# Patient Record
Sex: FEMALE | Race: WHITE | NOT HISPANIC OR LATINO | URBAN - NONMETROPOLITAN AREA
[De-identification: names, ages, dates, MRNs, and addresses within clinical notes are randomized per-mention and may not be internally consistent; named-entity substitution may affect disease eponyms.]

---

## 2017-03-16 NOTE — PATIENT DISCUSSION
RAFAELA OU:  PRESCRIBED UV PROTECTION TO SLOW GROWTH. PRESCRIBE ARTIFICAL TEARS TO INCREASE COMFORT.

## 2017-03-16 NOTE — PATIENT DISCUSSION
POSTERIOR CAPSULAR FIBROSIS, OU:  VISUALLY SIGNIFICANT. OPTION OF YAG LASER VERSUS UPDATING GLASSES VERSUS FOLLOWING DISCUSSED. RBA'S DISCUSSED, PATIENT UNDERSTANDS AND DESIRES YAG LASER TO INCREASE VISION FOR READING. SCHEDULE YAG LASER OD FIRST THEN OS.

## 2017-03-16 NOTE — PATIENT DISCUSSION
NONPROLIFERATIVE DIABETIC RETINOPATHY: PATIENT INSTRUCTED TO RETURN TO PCP FOR CONTINUED BLOOD SUGAR MONITORING AND RETURN FOR FOLLOW-UP AS SCHEDULED FOR DILATED FUNDUS EXAM.

## 2018-06-05 PROBLEM — Z96.1: Noted: 2020-12-08

## 2018-06-05 PROBLEM — H35.341: Noted: 2018-06-05

## 2018-06-05 PROBLEM — H11.822: Noted: 2020-12-08

## 2018-06-05 PROBLEM — H11.821: Noted: 2020-12-08

## 2019-05-07 NOTE — PATIENT DISCUSSION
DRY EYE SYNDROME OS: RX ARTIFICIAL TEARS AS NEEDED TO INCREASE COMFORT. IF SYMPTOMS PERSIST CONSIDER PUNCTAL PLUGS OR RESTASIS.

## 2020-12-08 ENCOUNTER — IMPORTED ENCOUNTER (OUTPATIENT)
Dept: URBAN - NONMETROPOLITAN AREA CLINIC 1 | Facility: CLINIC | Age: 60
End: 2020-12-08

## 2020-12-08 PROCEDURE — 92012 INTRM OPH EXAM EST PATIENT: CPT

## 2020-12-08 NOTE — PATIENT DISCUSSION
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

## 2021-07-30 ENCOUNTER — IMPORTED ENCOUNTER (OUTPATIENT)
Dept: URBAN - NONMETROPOLITAN AREA CLINIC 1 | Facility: CLINIC | Age: 61
End: 2021-07-30

## 2021-07-30 PROBLEM — Z96.1: Noted: 2021-07-30

## 2021-07-30 PROBLEM — H35.371: Noted: 2021-07-30

## 2021-07-30 PROBLEM — H26.493: Noted: 2021-07-30

## 2021-07-30 PROBLEM — H43.813: Noted: 2021-07-30

## 2021-07-30 PROBLEM — H02.403: Noted: 2021-07-30

## 2021-07-30 PROCEDURE — 92134 CPTRZ OPH DX IMG PST SGM RTA: CPT

## 2021-07-30 PROCEDURE — 99214 OFFICE O/P EST MOD 30 MIN: CPT

## 2021-07-30 NOTE — PATIENT DISCUSSION
PVD (Chronic OD Acute OS)-Retina flat 360 with no breaks tears or heme.-S&S of RD/RT reviewed with pt.-Stressed that pt should contact our office right away with any changes or increase in symptoms. -MAC OCT ordered today and reviewd-RTC 7-8 weeks for follow-upERM OD-Longstanding and stable MAC OCT ordered and reviewed todayPCO OD>OS-Scheduled for [facility] [place] in [place]; keep apptPC IOL OU-Doing well monitor. Ptosis OU-Discussed Upneeq rx sent; 's Notes: [facility]

## 2021-09-16 NOTE — PATIENT DISCUSSION
NONPROLIFERATIVE DIABETIC RETINOPATHY: STABLE, MAC OCT DONE TO DOCUMENT.  PATIENT INSTRUCTED TO RETURN TO PCP FOR CONTINUED BLOOD SUGAR MONITORING AND RETURN FOR FOLLOW-UP AS SCHEDULED FOR DILATED FUNDUS EXAM.

## 2021-09-20 ENCOUNTER — IMPORTED ENCOUNTER (OUTPATIENT)
Dept: URBAN - NONMETROPOLITAN AREA CLINIC 1 | Facility: CLINIC | Age: 61
End: 2021-09-20

## 2021-09-20 PROBLEM — H43.813: Noted: 2021-09-20

## 2021-09-20 PROBLEM — H10.412: Noted: 2021-09-20

## 2021-09-20 PROBLEM — Z96.1: Noted: 2021-09-20

## 2021-09-20 PROBLEM — H35.371: Noted: 2021-09-20

## 2021-09-20 PROBLEM — H26.492: Noted: 2021-09-20

## 2021-09-20 PROBLEM — H02.403: Noted: 2021-09-20

## 2021-09-20 PROCEDURE — 99214 OFFICE O/P EST MOD 30 MIN: CPT

## 2021-09-20 NOTE — PATIENT DISCUSSION
GPC Os-Symptoms started after ocular procedure in [address] [place] [address] (conj cyst removal). -No corneal involvement-Start PRED 1% QID OS and recheck in 1 week. PVD (Chronic OU)-Retina flat 360 with no breaks tears or heme.-S&S of RD/RT reviewed with pt.-Stressed that pt should contact our office right away with any changes or increase in symptoms. ERM OD-Longstanding and stable PCO OSPC IOL OU-Doing well monitor. Ptosis OU-Discussed Upneeq rx sent; 's Notes: [facility]

## 2021-09-28 ENCOUNTER — PREPPED CHART (OUTPATIENT)
Dept: RURAL CLINIC 2 | Facility: CLINIC | Age: 61
End: 2021-09-28

## 2021-09-28 ENCOUNTER — IMPORTED ENCOUNTER (OUTPATIENT)
Dept: URBAN - NONMETROPOLITAN AREA CLINIC 1 | Facility: CLINIC | Age: 61
End: 2021-09-28

## 2021-09-28 PROCEDURE — 99213 OFFICE O/P EST LOW 20 MIN: CPT

## 2021-09-28 NOTE — PATIENT DISCUSSION
GPC OS-Much improved etiology unclear-Reduce PRED to BID OS x 2 weeks then stop. PVD (Chronic OU)-Retina flat 360 with no breaks tears or heme.-S&S of RD/RT reviewed with pt.-Stressed that pt should contact our office right away with any changes or increase in symptoms. ERM OD-Longstanding and stable PCO OS-Monitor. PC IOL OU-Doing well monitor.   S/P YAG PC ODPtosis OU-Discussed Upneeq rx sent; 's Notes: [facility]

## 2022-03-29 ENCOUNTER — ESTABLISHED PATIENT (OUTPATIENT)
Dept: RURAL CLINIC 2 | Facility: CLINIC | Age: 62
End: 2022-03-29

## 2022-03-29 DIAGNOSIS — H35.371: ICD-10-CM

## 2022-03-29 DIAGNOSIS — Z96.1: ICD-10-CM

## 2022-03-29 DIAGNOSIS — H26.492: ICD-10-CM

## 2022-03-29 DIAGNOSIS — H43.813: ICD-10-CM

## 2022-03-29 DIAGNOSIS — H00.11: ICD-10-CM

## 2022-03-29 DIAGNOSIS — H16.223: ICD-10-CM

## 2022-03-29 PROCEDURE — 99214 OFFICE O/P EST MOD 30 MIN: CPT

## 2022-03-29 ASSESSMENT — VISUAL ACUITY
OS_SC: 20/25
OD_PH: 20/25-2
OD_SC: 20/50

## 2022-03-29 NOTE — PATIENT DISCUSSION
Discussed the use of warm compresses and continuing AUGMENTIN 875/125 BID PO x 1 week.  Diflucan also sent due to secondary coinfections.

## 2022-04-10 ASSESSMENT — TONOMETRY
OD_IOP_MMHG: 13
OS_IOP_MMHG: 15
OS_IOP_MMHG: 14
OS_IOP_MMHG: 14
OD_IOP_MMHG: 14
OD_IOP_MMHG: 12

## 2022-04-10 ASSESSMENT — VISUAL ACUITY
OD_CC: 20/30
OD_CC: 20/50-2
OD_CC: 20/70
OD_PH: 20/60
OS_CC: 20/30-1
OD_PH: 20/40
OS_CC: 20/40
OS_CC: 20/70
OS_PH: 20/25-2
OS_PH: 20/40
OS_PH: 20/25
OS_CC: 20/50+3
OD_CC: 20/30

## 2023-01-30 NOTE — PATIENT DISCUSSION
Nicanor Closs PATIENT INSTRUCTED TO RETURN TO PCP FOR  CONTINUED BLOOD SUGAR MONITORING AND RETURN FOR FOLLOW-UP AS SCHEDULED FOR DILATED EXAMS.

## 2023-01-30 NOTE — PATIENT DISCUSSION
: PRESCRIBE WARM COMPRESSES AND EYELID SCRUBS QD-BID, ARTIFICIAL TEARS BIDQID, AND ERYTHROMYCIN OPHTHALMIC OINTMENT EVERY NIGHT AS NEEDED. RETURN FOR FOLLOWUP AS SCHEDULED.

## 2023-10-02 ENCOUNTER — ESTABLISHED PATIENT (OUTPATIENT)
Dept: RURAL CLINIC 2 | Facility: CLINIC | Age: 63
End: 2023-10-02

## 2023-10-02 DIAGNOSIS — H00.12: ICD-10-CM

## 2023-10-02 DIAGNOSIS — H43.813: ICD-10-CM

## 2023-10-02 DIAGNOSIS — H52.4: ICD-10-CM

## 2023-10-02 DIAGNOSIS — H52.223: ICD-10-CM

## 2023-10-02 DIAGNOSIS — Z96.1: ICD-10-CM

## 2023-10-02 DIAGNOSIS — H16.223: ICD-10-CM

## 2023-10-02 DIAGNOSIS — H35.371: ICD-10-CM

## 2023-10-02 DIAGNOSIS — H26.492: ICD-10-CM

## 2023-10-02 PROCEDURE — 92015 DETERMINE REFRACTIVE STATE: CPT

## 2023-10-02 PROCEDURE — 92134 CPTRZ OPH DX IMG PST SGM RTA: CPT

## 2023-10-02 PROCEDURE — 99214 OFFICE O/P EST MOD 30 MIN: CPT

## 2023-10-02 RX ORDER — POVIDONE 2.5 MG/.5G: SOLUTION, GEL FORMING / DROPS OPHTHALMIC

## 2023-10-02 ASSESSMENT — TONOMETRY
OD_IOP_MMHG: 14
OS_IOP_MMHG: 12

## 2023-10-02 ASSESSMENT — VISUAL ACUITY
OD_CC: 20/30
OS_CC: 20/30

## 2025-01-17 ENCOUNTER — EMERGENCY VISIT (OUTPATIENT)
Age: 65
End: 2025-01-17

## 2025-01-17 DIAGNOSIS — H10.45: ICD-10-CM

## 2025-01-17 DIAGNOSIS — H16.223: ICD-10-CM

## 2025-01-17 PROCEDURE — 99213 OFFICE O/P EST LOW 20 MIN: CPT
